# Patient Record
Sex: FEMALE | Race: BLACK OR AFRICAN AMERICAN | NOT HISPANIC OR LATINO | Employment: UNEMPLOYED | URBAN - METROPOLITAN AREA
[De-identification: names, ages, dates, MRNs, and addresses within clinical notes are randomized per-mention and may not be internally consistent; named-entity substitution may affect disease eponyms.]

---

## 2020-03-25 ENCOUNTER — SOCIAL WORK (OUTPATIENT)
Dept: ADMINISTRATIVE | Facility: OTHER | Age: 25
End: 2020-03-25

## 2020-03-25 NOTE — PROGRESS NOTES
SW met with pt regarding initial OB assessment. Pt stated this is her 4th pregnancy/1-miscarriage. Pt stated lives with her boyfriend/children-4,2 and able to perform ADL's independently. Pt stated support system is her cousin/[employer]. Pt stated has medicaid([facility]). Pt stated does not have WIC. SW provide pt with information on other community resources.SW faxed and scanned pt's notification of pregnancy into epic.  No other needs identified at this time.    Huyen Madrid,MSW  Pager#[phone]

## 2020-03-26 PROBLEM — A59.9 TRICHOMONIASIS: Status: ACTIVE | Noted: 2020-03-26

## 2020-03-27 PROBLEM — O98.811 CHLAMYDIA INFECTION AFFECTING PREGNANCY IN FIRST TRIMESTER: Status: ACTIVE | Noted: 2020-03-27

## 2020-03-27 PROBLEM — O98.111 SYPHILIS AFFECTING PREGNANCY IN FIRST TRIMESTER: Status: ACTIVE | Noted: 2020-03-27

## 2020-03-27 PROBLEM — A74.9 CHLAMYDIA INFECTION AFFECTING PREGNANCY IN FIRST TRIMESTER: Status: ACTIVE | Noted: 2020-03-27

## 2022-07-25 ENCOUNTER — SOCIAL WORK (OUTPATIENT)
Dept: ADMINISTRATIVE | Facility: OTHER | Age: 27
End: 2022-07-25

## 2022-07-25 NOTE — PROGRESS NOTES
SW completed pt's notification of pregnancy and faxed and into epic. No other needs identified at this time.    Huyen Madrid,MSW  Pager#[phone]

## 2022-08-09 PROBLEM — Z30.09 UNWANTED FERTILITY: Status: ACTIVE | Noted: 2022-08-09

## 2022-08-09 PROBLEM — Z86.19 HISTORY OF SYPHILIS: Status: ACTIVE | Noted: 2022-08-09

## 2023-06-13 ENCOUNTER — PATIENT MESSAGE (OUTPATIENT)
Dept: RESEARCH | Facility: HOSPITAL | Age: 28
End: 2023-06-13